# Patient Record
Sex: FEMALE | Race: WHITE | NOT HISPANIC OR LATINO | Employment: UNEMPLOYED | URBAN - METROPOLITAN AREA
[De-identification: names, ages, dates, MRNs, and addresses within clinical notes are randomized per-mention and may not be internally consistent; named-entity substitution may affect disease eponyms.]

---

## 2024-03-21 ENCOUNTER — APPOINTMENT (OUTPATIENT)
Dept: OBSTETRICS AND GYNECOLOGY | Facility: CLINIC | Age: 24
End: 2024-03-21
Payer: COMMERCIAL

## 2024-04-01 ENCOUNTER — APPOINTMENT (OUTPATIENT)
Dept: PRIMARY CARE | Facility: CLINIC | Age: 24
End: 2024-04-01
Payer: COMMERCIAL

## 2024-04-03 PROBLEM — E55.9 VITAMIN D DEFICIENCY: Status: ACTIVE | Noted: 2024-04-03

## 2024-04-03 PROBLEM — K21.9 GERD (GASTROESOPHAGEAL REFLUX DISEASE): Status: ACTIVE | Noted: 2019-12-24

## 2024-05-10 ENCOUNTER — APPOINTMENT (OUTPATIENT)
Dept: PRIMARY CARE | Facility: CLINIC | Age: 24
End: 2024-05-10
Payer: COMMERCIAL

## 2024-05-10 NOTE — PROGRESS NOTES
Outpatient Visit Note    No chief complaint on file.      HPI:  Annalisa Hudson is a 23 y.o. female who presents to the office as a new patient for annual well exam.    Additionally, patient presents with multiple concerns including recurrent tonsil stones, cervical lymphadenopathy and frequent loose stools.  States have not been seen by primary care provider in some time    Last health maintenance exam approximately ___. Last blood work was completed ___ which noted ___.    Well Exam:  Overall, they describe their health as ___ with no reports of recent illness or hospitalization. They state that their diet is ___. In regards to physical activity, ___. Denies any significant sleep complaints. Denies issues of chest pain, shortness of breath, headaches, vision/hearing changes, abdominal pain, vomiting, diarrhea, melena, hematochezia, constipation or urinary symptoms.    Preventative Health Maintenance:  In regards to preventative health maintenance, last Tdap received ___. Flu shot ____. Pneumonia vaccination series ___. In regards to CRC screening, ___. Hepatitis C screening ____. Shingles vaccination completed/not pursued to-date.  COVID-19 vaccine series completed/with patient currently due for booster/not pursued to-date.    Female:  She has an established relationship with ____ of OB/GYN who organizes her routine female health maintenance exams. Last Mammogram ____. Last Pap ___. No history of abnormals. DEXA scan ___.    Current Medications  Current Outpatient Medications   Medication Instructions    FeroSuL 325 mg (65 mg iron) tablet TAKE ONE TABLET BY MOUTH DAILY WITH FOOD        Allergies  Not on File     Immunizations  Immunization History   Administered Date(s) Administered    DTaP vaccine, pediatric  (INFANRIX) 05/29/2007    DTaP, Unspecified 2002, 2002, 2002, 12/02/2003    Hep A, Unspecified 05/25/2010, 06/03/2011    Hepatitis B vaccine, pediatric/adolescent (RECOMBIVAX,  ENGERIX) 2002, 2002, 02/28/2003    Hib (HbOC) 2002, 2002, 2002, 09/04/2003    Hib / Hep B 2002    MMR vaccine, subcutaneous (MMR II) 09/04/2003, 05/29/2007    Meningococcal MCV4P 02/12/2019    Pneumococcal Conjugate PCV 7 2002, 2002, 2002, 06/04/2003    Poliovirus vaccine, subcutaneous (IPOL) 2002, 2002, 2002, 05/29/2007    Tdap vaccine, age 7 year and older (BOOSTRIX, ADACEL) 02/12/2019    Varicella vaccine, subcutaneous (VARIVAX) 06/04/2003, 05/29/2007        Past Medical History:   Diagnosis Date    Anemia complicating pregnancy, unspecified trimester ([facility]) 01/03/2023    Anemia in pregnancy    Encounter for insertion of intrauterine contraceptive device 01/04/2022    Encounter for insertion of mirena IUD    Encounter for pregnancy test, result negative 10/18/2022    Pregnancy examination or test, negative result    Encounter for pregnancy test, result negative 12/21/2022    Negative pregnancy test    Encounter for pregnancy test, result positive ([facility]) 01/21/2021    Pregnancy test positive    Encounter for screening for infections with a predominantly sexual mode of transmission 10/18/2022    Routine screening for STI (sexually transmitted infection)    Encounter for supervision of normal pregnancy, unspecified, first trimester ([facility]) 02/15/2021    Encounter for supervision of normal pregnancy in first trimester    Encounter for supervision of normal pregnancy, unspecified, third trimester ([facility]) 09/03/2021    Encounter for supervision of normal pregnancy in third trimester    Encounter for supervision of normal pregnancy, unspecified, unspecified trimester ([facility]) 01/21/2021    Pregnancy at early stage    Maternal care for (suspected) fetal abnormality and damage, unspecified, not applicable or unspecified ([facility])     Suspected fetal anomaly, antepartum    Other specified pregnancy related conditions, unspecified trimester  ([facility]) 2021    Headache in pregnancy    Other specified pregnancy related conditions, unspecified trimester ([facility]) 2021    Heartburn in pregnancy    Personal history of other complications of pregnancy, childbirth and the puerperium     History of  premature rupture of membranes (PPROM)    Personal history of other diseases of the female genital tract 10/18/2022    History of vaginal discharge    Personal history of other diseases of the female genital tract 2022    History of vaginal pruritus    Personal history of other infectious and parasitic diseases 2021    History of candidiasis of vagina    Personal history of other specified conditions 2021    History of urinary urgency    Unspecified infection of urinary tract in pregnancy, unspecified trimester ([facility]) 2021    UTI in pregnancy    Vomiting of pregnancy, unspecified ([facility]) 2021    Nausea and vomiting in pregnancy      Past Surgical History:   Procedure Laterality Date    OTHER SURGICAL HISTORY  2021    Cholecystectomy     No family history on file.       ROS  All pertinent positive symptoms are included in the history of present illness.  All other systems have been reviewed and are negative and noncontributory to this patient's current ailments.    PHQ9/GAD7:        VITAL SIGNS  There were no vitals filed for this visit.    PHYSICAL EXAM  GENERAL APPEARANCE: alert and oriented, Pleasant and cooperative, No Acute Distress.   HEENT: EOMI, PERRLA, TMs intact and flat bilaterally, patent nares, normal oropharynx, MMM  NECK: no lymphadenopathy, no thyromegaly.   HEART: RRR, normal S1S2, no murmurs, click or rubs.   LUNGS: clear to auscultation bilaterally, no wheezes/rhonchi/rales.   ABDOMEN: soft, non-tender, no organomegaly, no masses palpated, no guarding or rigidity.   EXTREMITIES: no edema, normal ROM  SKIN: normal, no rash, unremarkable.   NEUROLOGIC EXAM: non-focal exam.   MUSCULOSKELETAL:  no gross abnormalities.   PSYCH: affect is normal, eye contact is good.     Assessment/Plan   {Assess/PlanSmartLinks:57371}    Additional Visit Plans:  - Complete history and physical examination was performed    GENERAL RECOMMENDATIONS:  - Complete review of history of physical exam completed today  - A healthy diet to maintain a normal BMI (under 25) to reduce heart disease, risk for diabetes encouraged.  - Exercising 150 minutes per week and eating healthy to reduce heart disease.  - Blood pressure screen completed.    BLOOD TESTING:  - Orders for fasting routine blood work given today, to be completed at your earliest convenience  - Will contact you with the blood work results once received and reviewed.    -Blood work recently completed prior to today's exam which was remarkable for ____    General Recommendations include:  - Cholesterol and diabetes screen if risk factors (overweight, high blood pressure).  - Sexually transmitted infections if risk factors.  - Hepatitis C virus screen for those born between [id number] - N/A /previously completed/ordered for blood work today    VACCINATIONS RECOMMENDATIONS:  - Flu shot annually - advocated seasonally/up-to-date  - Tetanus booster every 10 years - advocated/up-to-date  - Pneumonia vaccination starting at 65 years old (or earlier if risk factors - smoker, diabetic, heart or lung conditions) -not due yet/advocated/up-to-date  - Shingles vaccine for those 50 years or older - check with your insurance for SHINGRIX coverage and get it at your local pharmacy -not due yet/advocated/up-to-date  -COVID-19 vaccine series completed with patient currently due for booster/advocated    SCREENINGS RECOMMENDATIONS:  -Colon cancer screening (with colonoscopy or Cologuard) for men and women starting at age 45 until 74 years old - not due yet/discussed and advocated/up-to-date    (female)  - Cervical cancer screening (pap test) in women starting at age 21 until age 65 years old  -not due yet/advocated to be completed with female /up-to-date  - Mammogram screening for breast cancer in women starting at 40-50 years and every 1-2 years until age 74 - not due yet/advocated/up-to-date  - Bone density screening (DEXA) for osteoporosis in women aged 65 years and older (in younger women who are higher risk) - not due yet/advocated/up-to-date    SMOKING HISTORY SCREENING RECOMMENDATIONS:  - For men and women who have a 30 pack year smoking history and currently smoke or have quit in the past 15 years, starting age 55 until age 80 years - a yearly low dose CT scan screening for lung cancer is recommended - not due yet/advocated/up-to-date  - For men only who have smoked 100+ cigarettes in a lifetime, starting age 65, until 75 years old - a one time ultrasound screening for abdominal aneurysms - not due yet/advocated/up-to-date    Next Wellness Exam/Annual Physical Due    Counseling:       Medication education:         Education:  The patient is counseled regarding potential side-effects of all new medications        Understanding:  Patient expressed understanding        Adherence:  No barriers to adherence identified      ** Please excuse any errors in grammar or translation related to this dictation. Voice recognition software was utilized to prepare this document. **

## 2024-09-10 ENCOUNTER — HOSPITAL ENCOUNTER (EMERGENCY)
Facility: HOSPITAL | Age: 24
Discharge: HOME | End: 2024-09-10

## 2024-09-10 VITALS
DIASTOLIC BLOOD PRESSURE: 62 MMHG | OXYGEN SATURATION: 100 % | SYSTOLIC BLOOD PRESSURE: 136 MMHG | BODY MASS INDEX: 31.28 KG/M2 | TEMPERATURE: 97 F | WEIGHT: 170 LBS | HEART RATE: 68 BPM | RESPIRATION RATE: 18 BRPM | HEIGHT: 62 IN

## 2024-09-10 DIAGNOSIS — S70.12XA CONTUSION OF LEFT THIGH, INITIAL ENCOUNTER: Primary | ICD-10-CM

## 2024-09-10 LAB
BASOPHILS # BLD AUTO: 0.05 X10*3/UL (ref 0–0.1)
BASOPHILS NFR BLD AUTO: 0.6 %
EOSINOPHIL # BLD AUTO: 0.08 X10*3/UL (ref 0–0.7)
EOSINOPHIL NFR BLD AUTO: 0.9 %
ERYTHROCYTE [DISTWIDTH] IN BLOOD BY AUTOMATED COUNT: 12.7 % (ref 11.5–14.5)
HCT VFR BLD AUTO: 36.1 % (ref 36–46)
HGB BLD-MCNC: 12.4 G/DL (ref 12–16)
IMM GRANULOCYTES # BLD AUTO: 0.03 X10*3/UL (ref 0–0.7)
IMM GRANULOCYTES NFR BLD AUTO: 0.3 % (ref 0–0.9)
LYMPHOCYTES # BLD AUTO: 2.04 X10*3/UL (ref 1.2–4.8)
LYMPHOCYTES NFR BLD AUTO: 23.2 %
MCH RBC QN AUTO: 29.5 PG (ref 26–34)
MCHC RBC AUTO-ENTMCNC: 34.3 G/DL (ref 32–36)
MCV RBC AUTO: 86 FL (ref 80–100)
MONOCYTES # BLD AUTO: 0.8 X10*3/UL (ref 0.1–1)
MONOCYTES NFR BLD AUTO: 9.1 %
NEUTROPHILS # BLD AUTO: 5.79 X10*3/UL (ref 1.2–7.7)
NEUTROPHILS NFR BLD AUTO: 65.9 %
NRBC BLD-RTO: 0 /100 WBCS (ref 0–0)
PLATELET # BLD AUTO: 402 X10*3/UL (ref 150–450)
RBC # BLD AUTO: 4.2 X10*6/UL (ref 4–5.2)
WBC # BLD AUTO: 8.8 X10*3/UL (ref 4.4–11.3)

## 2024-09-10 PROCEDURE — 85025 COMPLETE CBC W/AUTO DIFF WBC: CPT | Performed by: PHYSICIAN ASSISTANT

## 2024-09-10 PROCEDURE — 99283 EMERGENCY DEPT VISIT LOW MDM: CPT

## 2024-09-10 PROCEDURE — 36415 COLL VENOUS BLD VENIPUNCTURE: CPT | Performed by: PHYSICIAN ASSISTANT

## 2024-09-10 PROCEDURE — 76882 US LMTD JT/FCL EVL NVASC XTR: CPT | Performed by: PHYSICIAN ASSISTANT

## 2024-09-10 ASSESSMENT — LIFESTYLE VARIABLES
HAVE YOU EVER FELT YOU SHOULD CUT DOWN ON YOUR DRINKING: NO
TOTAL SCORE: 0
HAVE PEOPLE ANNOYED YOU BY CRITICIZING YOUR DRINKING: NO
EVER HAD A DRINK FIRST THING IN THE MORNING TO STEADY YOUR NERVES TO GET RID OF A HANGOVER: NO
EVER FELT BAD OR GUILTY ABOUT YOUR DRINKING: NO

## 2024-09-10 ASSESSMENT — COLUMBIA-SUICIDE SEVERITY RATING SCALE - C-SSRS
2. HAVE YOU ACTUALLY HAD ANY THOUGHTS OF KILLING YOURSELF?: NO
6. HAVE YOU EVER DONE ANYTHING, STARTED TO DO ANYTHING, OR PREPARED TO DO ANYTHING TO END YOUR LIFE?: NO
1. IN THE PAST MONTH, HAVE YOU WISHED YOU WERE DEAD OR WISHED YOU COULD GO TO SLEEP AND NOT WAKE UP?: NO

## 2024-09-10 ASSESSMENT — PAIN - FUNCTIONAL ASSESSMENT: PAIN_FUNCTIONAL_ASSESSMENT: 0-10

## 2024-09-10 ASSESSMENT — PAIN DESCRIPTION - PAIN TYPE: TYPE: ACUTE PAIN

## 2024-09-10 ASSESSMENT — PAIN SCALES - GENERAL: PAINLEVEL_OUTOF10: 2

## 2024-09-10 NOTE — Clinical Note
[place] was seen and treated in our emergency department on 9/10/2024.  She may return to work on 09/11/2024.       If you have any questions or concerns, please don't hesitate to call.      Donnie Rizzo PA-C

## 2024-09-11 NOTE — ED PROVIDER NOTES
HPI   Chief Complaint   Patient presents with    Bleeding/Bruising     Pt states she is having left inner thigh bruising. Pt states she does not know how the bruising got there. Pt states she did not hit her leg or anything. Pt states there is a red dot in the middle. Of the bruising. Pt states she is also 4 weeks. Pregnant pt states she is just concerned.        24-year-old female presented emergency department the chief complaint of bruise on her left inner thigh.  She does not remember any specific injury or trauma.  She is 4 weeks pregnant.  Denies vaginal bleeding or discharge.  Well-appearing nontoxic afebrile.  She is ambulatory.  No other complaint.              Patient History   Past Medical History:   Diagnosis Date    Anemia complicating pregnancy, unspecified trimester ([facility]) 01/03/2023    Anemia in pregnancy    Encounter for insertion of intrauterine contraceptive device 01/04/2022    Encounter for insertion of mirena IUD    Encounter for pregnancy test, result negative 10/18/2022    Pregnancy examination or test, negative result    Encounter for pregnancy test, result negative 12/21/2022    Negative pregnancy test    Encounter for pregnancy test, result positive ([facility]) 01/21/2021    Pregnancy test positive    Encounter for screening for infections with a predominantly sexual mode of transmission 10/18/2022    Routine screening for STI (sexually transmitted infection)    Encounter for supervision of normal pregnancy, unspecified, first trimester ([facility]) 02/15/2021    Encounter for supervision of normal pregnancy in first trimester    Encounter for supervision of normal pregnancy, unspecified, third trimester ([facility]) 09/03/2021    Encounter for supervision of normal pregnancy in third trimester    Encounter for supervision of normal pregnancy, unspecified, unspecified trimester ([facility]) 01/21/2021    Pregnancy at early stage    Maternal care for (suspected) fetal abnormality and damage,  unspecified, not applicable or unspecified ([facility])     Suspected fetal anomaly, antepartum    Other specified pregnancy related conditions, unspecified trimester ([facility]) 2021    Headache in pregnancy    Other specified pregnancy related conditions, unspecified trimester ([facility]) 2021    Heartburn in pregnancy    Personal history of other complications of pregnancy, childbirth and the puerperium     History of  premature rupture of membranes (PPROM)    Personal history of other diseases of the female genital tract 10/18/2022    History of vaginal discharge    Personal history of other diseases of the female genital tract 2022    History of vaginal pruritus    Personal history of other infectious and parasitic diseases 2021    History of candidiasis of vagina    Personal history of other specified conditions 2021    History of urinary urgency    Unspecified infection of urinary tract in pregnancy, unspecified trimester ([facility]) 2021    UTI in pregnancy    Vomiting of pregnancy, unspecified ([facility]) 2021    Nausea and vomiting in pregnancy     Past Surgical History:   Procedure Laterality Date    OTHER SURGICAL HISTORY  2021    Cholecystectomy     No family history on file.  Social History     Tobacco Use    Smoking status: Not on file    Smokeless tobacco: Not on file   Substance Use Topics    Alcohol use: Not on file    Drug use: Not on file       Physical Exam   ED Triage Vitals [09/10/24 2149]   Temperature Heart Rate Respirations BP   36.1 °C (97 °F) 68 18 136/62      Pulse Ox Temp Source Heart Rate Source Patient Position   100 % Temporal Monitor Sitting      BP Location FiO2 (%)     Left arm --       Physical Exam  Vitals and nursing note reviewed.   Constitutional:       Appearance: Normal appearance.   HENT:      Head: Normocephalic.      Nose: Nose normal.   Cardiovascular:      Rate and Rhythm: Normal rate.      Pulses: Normal pulses.   Pulmonary:       Effort: Pulmonary effort is normal.   Abdominal:      General: Abdomen is flat.      Palpations: Abdomen is soft.   Musculoskeletal:         General: Normal range of motion.      Cervical back: Normal range of motion.   Skin:     Comments: Contusion to the left medial thigh   Neurological:      General: No focal deficit present.      Mental Status: She is alert and oriented to person, place, and time.   Psychiatric:         Mood and Affect: Mood normal.         Behavior: Behavior normal.           ED Course & MDM   Diagnoses as of 09/10/24 5978   Contusion of left thigh, initial encounter                 No data recorded     [place] Coma Scale Score: 15 (09/10/24 2254 : Ilana Angel RN)                           Medical Decision Making  I have seen and evaluated this patient.  Physician available for consultation.  Vital signs have been reviewed.  All laboratory and diagnostic imaging is reviewed by myself and interpreted by myself unless otherwise stated.  Additionally imaging is interpreted by radiologist.    Bedside ultrasound by myself does not show any abscess or deep vein thrombosis my suspicion for this things is very low as the patient has a contusion to the thigh.  I did check a CBC and there is no blood count or platelet abnormality.  Patient released from emergent standpoint for outpatient follow-up.    Labs Reviewed  CBC WITH AUTO DIFFERENTIAL  Point of Care Ultrasound    (Results Pending)  Medications - No data to display  Discharge Medication List as of 9/10/2024 11:27 PM                    Procedure    Performed by: Donnie Rizzo PA-C  Authorized by: Donnie Rizzo PA-C    Procedure: DVT Ultrasound     Findings:  L Common Femoral Vein: negativeL Popliteal Vein: negativeDeep Femoral Vein: L deep femoral vein    Impression:  DVT: The deep veins were COMPRESSIBLE and were NEGATIVE for thrombus.  Procedure: Soft Tissue Ultrasound    Findings:  Fluid Collection: NO fluid collection was  identified.  Cobblestoning: NO cobblestoning was identified  Color Doppler: NO abnormal color flow was identified.    Impression:  Soft Tissue: The soft tissue ultrasound exam was NORMAL.           Donnie Rizzo PA-C  09/11/24 0021

## 2024-09-24 ENCOUNTER — TELEPHONE (OUTPATIENT)
Dept: OBSTETRICS AND GYNECOLOGY | Facility: CLINIC | Age: 24
End: 2024-09-24

## 2024-10-15 ENCOUNTER — APPOINTMENT (OUTPATIENT)
Dept: OBSTETRICS AND GYNECOLOGY | Facility: CLINIC | Age: 24
End: 2024-10-15

## 2024-10-28 PROCEDURE — 86780 TREPONEMA PALLIDUM: CPT

## 2024-10-28 PROCEDURE — 80349 CANNABINOIDS NATURAL: CPT

## 2024-10-28 PROCEDURE — 87086 URINE CULTURE/COLONY COUNT: CPT

## 2024-10-28 PROCEDURE — 86901 BLOOD TYPING SEROLOGIC RH(D): CPT

## 2024-10-28 PROCEDURE — 87389 HIV-1 AG W/HIV-1&-2 AB AG IA: CPT

## 2024-10-28 PROCEDURE — 87591 N.GONORRHOEAE DNA AMP PROB: CPT

## 2024-10-28 PROCEDURE — 87340 HEPATITIS B SURFACE AG IA: CPT

## 2024-10-28 PROCEDURE — 80307 DRUG TEST PRSMV CHEM ANLYZR: CPT

## 2024-10-28 PROCEDURE — 87661 TRICHOMONAS VAGINALIS AMPLIF: CPT

## 2024-10-28 PROCEDURE — 87491 CHLMYD TRACH DNA AMP PROBE: CPT

## 2024-10-28 PROCEDURE — 86803 HEPATITIS C AB TEST: CPT

## 2024-10-28 PROCEDURE — 86317 IMMUNOASSAY INFECTIOUS AGENT: CPT

## 2024-10-28 PROCEDURE — 86850 RBC ANTIBODY SCREEN: CPT

## 2024-10-28 PROCEDURE — 86900 BLOOD TYPING SEROLOGIC ABO: CPT

## 2024-10-28 PROCEDURE — 85027 COMPLETE CBC AUTOMATED: CPT

## 2024-10-29 ENCOUNTER — LAB REQUISITION (OUTPATIENT)
Dept: LAB | Facility: HOSPITAL | Age: 24
End: 2024-10-29

## 2024-10-29 DIAGNOSIS — N91.2 AMENORRHEA, UNSPECIFIED: ICD-10-CM

## 2024-10-29 DIAGNOSIS — Z3A.11 11 WEEKS GESTATION OF PREGNANCY (HHS-HCC): ICD-10-CM

## 2024-10-29 LAB
ABO GROUP (TYPE) IN BLOOD: NORMAL
AMPHETAMINES UR QL SCN: ABNORMAL
ANTIBODY SCREEN: NORMAL
BARBITURATES UR QL SCN: ABNORMAL
BENZODIAZ UR QL SCN: ABNORMAL
BZE UR QL SCN: ABNORMAL
CANNABINOIDS UR QL SCN: ABNORMAL
ERYTHROCYTE [DISTWIDTH] IN BLOOD BY AUTOMATED COUNT: 12.8 % (ref 11.5–14.5)
FENTANYL+NORFENTANYL UR QL SCN: ABNORMAL
HBV SURFACE AG SERPL QL IA: NONREACTIVE
HCT VFR BLD AUTO: 35.9 % (ref 36–46)
HCV AB SER QL: NONREACTIVE
HGB BLD-MCNC: 12 G/DL (ref 12–16)
HIV 1+2 AB+HIV1 P24 AG SERPL QL IA: NONREACTIVE
HOLD SPECIMEN: NORMAL
MCH RBC QN AUTO: 29.2 PG (ref 26–34)
MCHC RBC AUTO-ENTMCNC: 33.4 G/DL (ref 32–36)
MCV RBC AUTO: 87 FL (ref 80–100)
METHADONE UR QL SCN: ABNORMAL
NRBC BLD-RTO: 0 /100 WBCS (ref 0–0)
OPIATES UR QL SCN: ABNORMAL
OXYCODONE+OXYMORPHONE UR QL SCN: ABNORMAL
PCP UR QL SCN: ABNORMAL
PLATELET # BLD AUTO: 413 X10*3/UL (ref 150–450)
RBC # BLD AUTO: 4.11 X10*6/UL (ref 4–5.2)
REFLEX ADDED, ANEMIA PANEL: NORMAL
RH FACTOR (ANTIGEN D): NORMAL
RUBV IGG SERPL IA-ACNC: 2.3 IA
RUBV IGG SERPL QL IA: POSITIVE
TREPONEMA PALLIDUM IGG+IGM AB [PRESENCE] IN SERUM OR PLASMA BY IMMUNOASSAY: NONREACTIVE
WBC # BLD AUTO: 9.5 X10*3/UL (ref 4.4–11.3)

## 2024-10-30 ENCOUNTER — LAB REQUISITION (OUTPATIENT)
Dept: LAB | Facility: HOSPITAL | Age: 24
End: 2024-10-30

## 2024-10-30 DIAGNOSIS — Z12.4 ENCOUNTER FOR SCREENING FOR MALIGNANT NEOPLASM OF CERVIX: ICD-10-CM

## 2024-10-30 LAB
BACTERIA UR CULT: NORMAL
C TRACH RRNA SPEC QL NAA+PROBE: NEGATIVE
N GONORRHOEA DNA SPEC QL PROBE+SIG AMP: NEGATIVE
T VAGINALIS RRNA SPEC QL NAA+PROBE: NEGATIVE

## 2024-11-02 LAB — CARBOXYTHC UR-MCNC: >500 NG/ML

## 2024-11-05 LAB
CYTOLOGY CMNT CVX/VAG CYTO-IMP: NORMAL
LAB AP HPV GENOTYPE QUESTION: NO
LAB AP HPV HR: NORMAL
LAB AP PAP ADDITIONAL TESTS: NORMAL
LABORATORY COMMENT REPORT: NORMAL
LMP START DATE: NORMAL
MENSTRUAL HX REPORTED: NORMAL
PATH REPORT.TOTAL CANCER: NORMAL

## 2025-08-06 ENCOUNTER — TELEPHONE (OUTPATIENT)
Dept: FAMILY MEDICINE CLINIC | Age: 25
End: 2025-08-06